# Patient Record
Sex: FEMALE | URBAN - METROPOLITAN AREA
[De-identification: names, ages, dates, MRNs, and addresses within clinical notes are randomized per-mention and may not be internally consistent; named-entity substitution may affect disease eponyms.]

---

## 2020-12-28 ENCOUNTER — OFFICE VISIT (OUTPATIENT)
Dept: URBAN - METROPOLITAN AREA CLINIC 40 | Facility: CLINIC | Age: 40
End: 2020-12-28

## 2020-12-28 RX ORDER — NYSTATIN CREAM 100000 [USP'U]/G
CREAM TOPICAL
Qty: 30 G | Status: ACTIVE | COMMUNITY

## 2023-06-12 ENCOUNTER — OFFICE VISIT (OUTPATIENT)
Dept: URBAN - METROPOLITAN AREA CLINIC 40 | Facility: CLINIC | Age: 43
End: 2023-06-12
Payer: COMMERCIAL

## 2023-06-12 ENCOUNTER — LAB OUTSIDE AN ENCOUNTER (OUTPATIENT)
Dept: URBAN - METROPOLITAN AREA CLINIC 40 | Facility: CLINIC | Age: 43
End: 2023-06-12

## 2023-06-12 ENCOUNTER — DASHBOARD ENCOUNTERS (OUTPATIENT)
Age: 43
End: 2023-06-12

## 2023-06-12 VITALS
HEIGHT: 67 IN | HEART RATE: 64 BPM | TEMPERATURE: 94.3 F | DIASTOLIC BLOOD PRESSURE: 70 MMHG | SYSTOLIC BLOOD PRESSURE: 110 MMHG | BODY MASS INDEX: 32.68 KG/M2 | WEIGHT: 208.2 LBS

## 2023-06-12 DIAGNOSIS — K64.8 INTERNAL BLEEDING HEMORRHOIDS: ICD-10-CM

## 2023-06-12 DIAGNOSIS — Z86.010 PERSONAL HISTORY OF COLONIC POLYPS: ICD-10-CM

## 2023-06-12 DIAGNOSIS — Z80.0 FAMILY HX OF COLON CANCER: ICD-10-CM

## 2023-06-12 PROBLEM — 312824007: Status: ACTIVE | Noted: 2023-06-12

## 2023-06-12 PROBLEM — 75884004: Status: ACTIVE | Noted: 2023-06-12

## 2023-06-12 PROBLEM — 428283002: Status: ACTIVE | Noted: 2023-06-12

## 2023-06-12 PROCEDURE — 99204 OFFICE O/P NEW MOD 45 MIN: CPT | Performed by: INTERNAL MEDICINE

## 2023-06-12 RX ORDER — FLECAINIDE ACETATE 100 MG/1
0.5 TABLET TABLET ORAL
Status: ACTIVE | COMMUNITY

## 2023-06-12 RX ORDER — POLYETHYLENE GLYCOL 3350, SODIUM SULFATE, SODIUM CHLORIDE, POTASSIUM CHLORIDE, ASCORBIC ACID, SODIUM ASCORBATE 140-9-5.2G
AS DIRECTED KIT ORAL ONCE
Qty: 1 | Refills: 0 | OUTPATIENT
Start: 2023-06-12 | End: 2023-06-13

## 2023-06-12 RX ORDER — NYSTATIN CREAM 100000 [USP'U]/G
CREAM TOPICAL
Qty: 30 G | Status: ON HOLD | COMMUNITY

## 2023-06-12 NOTE — HPI-TODAY'S VISIT:
43-year-old  female here to establish care and schedule screening colonoscopy.  She states that her former GI provider has retired.  She has family history of colon cancer and polyps.  She has personal history of colon polyps.  Her last colonoscopy was approximately 4 years ago with finding of polyps. She denies abdominal  pain, NSAID use or blood in the stool.  She states that she has normal formed BM 5-6 times a week, with occasional bright red blood on toilet paper. She denies constipation, knowledge of hemorrhoids or rectal pain. No other concerns or complaints.

## 2023-07-20 ENCOUNTER — OFFICE VISIT (OUTPATIENT)
Dept: URBAN - METROPOLITAN AREA SURGERY CENTER 30 | Facility: SURGERY CENTER | Age: 43
End: 2023-07-20
Payer: COMMERCIAL

## 2023-07-20 DIAGNOSIS — Z86.010 ADENOMAS PERSONAL HISTORY OF COLONIC POLYPS: ICD-10-CM

## 2023-07-20 PROCEDURE — 45378 DIAGNOSTIC COLONOSCOPY: CPT | Performed by: INTERNAL MEDICINE

## 2023-07-20 PROCEDURE — G8907 PT DOC NO EVENTS ON DISCHARG: HCPCS | Performed by: INTERNAL MEDICINE

## 2023-08-21 ENCOUNTER — OFFICE VISIT (OUTPATIENT)
Dept: URBAN - METROPOLITAN AREA CLINIC 40 | Facility: CLINIC | Age: 43
End: 2023-08-21

## 2025-01-29 ENCOUNTER — ERX REFILL RESPONSE (OUTPATIENT)
Dept: URBAN - METROPOLITAN AREA CLINIC 40 | Facility: CLINIC | Age: 45
End: 2025-01-29

## 2025-01-29 ENCOUNTER — OFFICE VISIT (OUTPATIENT)
Dept: URBAN - METROPOLITAN AREA CLINIC 40 | Facility: CLINIC | Age: 45
End: 2025-01-29
Payer: COMMERCIAL

## 2025-01-29 ENCOUNTER — LAB OUTSIDE AN ENCOUNTER (OUTPATIENT)
Dept: URBAN - METROPOLITAN AREA CLINIC 40 | Facility: CLINIC | Age: 45
End: 2025-01-29

## 2025-01-29 VITALS
TEMPERATURE: 98 F | HEIGHT: 67 IN | HEART RATE: 78 BPM | BODY MASS INDEX: 31.86 KG/M2 | WEIGHT: 203 LBS | SYSTOLIC BLOOD PRESSURE: 104 MMHG | DIASTOLIC BLOOD PRESSURE: 80 MMHG

## 2025-01-29 DIAGNOSIS — R10.32 LLQ ABDOMINAL PAIN: ICD-10-CM

## 2025-01-29 PROCEDURE — 99214 OFFICE O/P EST MOD 30 MIN: CPT | Performed by: PHYSICIAN ASSISTANT

## 2025-01-29 RX ORDER — DICYCLOMINE HYDROCHLORIDE 10 MG/1
1 TABLET CAPSULE ORAL THREE TIMES A DAY
Qty: 90 TABLET | Refills: 1 | OUTPATIENT
Start: 2025-01-29 | End: 2025-03-30

## 2025-01-29 RX ORDER — FLECAINIDE ACETATE 100 MG/1
0.5 TABLET TABLET ORAL
Status: ACTIVE | COMMUNITY

## 2025-01-29 RX ORDER — DICYCLOMINE HYDROCHLORIDE 10 MG/1
1 TABLET CAPSULE ORAL THREE TIMES A DAY
Qty: 270 TABLET | Refills: 0 | OUTPATIENT

## 2025-01-29 RX ORDER — DICYCLOMINE HYDROCHLORIDE 10 MG/1
1 TABLET CAPSULE ORAL THREE TIMES A DAY
Qty: 90 TABLET | Refills: 1 | OUTPATIENT

## 2025-01-29 NOTE — PHYSICAL EXAM GASTROINTESTINAL
Abdomen , soft, TTP L >R, nondistended , no guarding or rigidity , no masses palpable , normal bowel sounds , Liver and Spleen,  no hepatosplenomegaly , liver nontender

## 2025-01-29 NOTE — HPI-TODAY'S VISIT:
Ms. Flores is a 44 year-old  female seen 6/12/23 to schedule surveillance colonosopy with history of colon polyps.  Patient presents in the office today with abdominal pain.  At the time of her colonoscopy in 2023, no evidence of colon polyps.  Internal hemorrhoids seen in his records.  Skin tags and a 5-year surveillance recommended.  Patient reports that the pain has begun only in the last week, initially improved after spontaneous bowel movement which was nonbloody.  No melena.  Denies nausea, vomiting.  No fever but reports chills on yesterday without recurrence.  No reported chest pain, shortness of breath.   Her pain is localized to the right and left lower quadrants, more in the left lower quadrant.  She has been seen by her OB/GYN provider and is scheduled for pelvic ultrasound.  She does still have irregular periods after history of partial hysterectomy. No regular use of NSAIDs, alcohol or tobacco products.  However, she is an  admits increased stressors recently.  No evidence of depressed mood.

## 2025-01-30 ENCOUNTER — TELEPHONE ENCOUNTER (OUTPATIENT)
Dept: URBAN - METROPOLITAN AREA CLINIC 40 | Facility: CLINIC | Age: 45
End: 2025-01-30

## 2025-01-30 RX ORDER — FLUCONAZOLE 100 MG/1
1 TABLET TABLET ORAL ONCE
Qty: 1 TABLET | Refills: 1 | OUTPATIENT
Start: 2025-01-30 | End: 2025-02-01

## 2025-01-30 RX ORDER — DICYCLOMINE HYDROCHLORIDE 10 MG/1
1 TABLET CAPSULE ORAL THREE TIMES A DAY
Qty: 90 TABLET | Refills: 0

## 2025-01-30 RX ORDER — CIPROFLOXACIN 500 MG/1
1 TABLET TABLET, FILM COATED ORAL
Qty: 14 TABLET | Refills: 0 | OUTPATIENT
Start: 2025-01-30 | End: 2025-02-06

## 2025-01-30 RX ORDER — METRONIDAZOLE 500 MG/1
1 TABLET TABLET ORAL THREE TIMES A DAY
Qty: 21 TABLET | Refills: 0 | OUTPATIENT
Start: 2025-01-30 | End: 2025-02-06

## 2025-02-17 ENCOUNTER — OFFICE VISIT (OUTPATIENT)
Dept: URBAN - METROPOLITAN AREA CLINIC 40 | Facility: CLINIC | Age: 45
End: 2025-02-17

## 2025-02-17 RX ORDER — DICYCLOMINE HYDROCHLORIDE 10 MG/1
1 TABLET CAPSULE ORAL THREE TIMES A DAY
Qty: 90 TABLET | Refills: 0 | Status: ACTIVE | COMMUNITY

## 2025-02-17 RX ORDER — FLECAINIDE ACETATE 100 MG/1
0.5 TABLET TABLET ORAL
Status: ACTIVE | COMMUNITY

## 2025-02-18 ENCOUNTER — OFFICE VISIT (OUTPATIENT)
Dept: URBAN - METROPOLITAN AREA CLINIC 40 | Facility: CLINIC | Age: 45
End: 2025-02-18

## 2025-02-18 RX ORDER — FLECAINIDE ACETATE 100 MG/1
0.5 TABLET TABLET ORAL
Status: ACTIVE | COMMUNITY

## 2025-02-18 RX ORDER — DICYCLOMINE HYDROCHLORIDE 10 MG/1
1 TABLET CAPSULE ORAL THREE TIMES A DAY
Qty: 90 TABLET | Refills: 0 | Status: ACTIVE | COMMUNITY

## 2025-02-18 NOTE — HPI-TODAY'S VISIT:
Ms. Flores is a 44 year-old  female who was seen 1/29/25 with abdominal pain.  At the time of her colonoscopy in 2023, no evidence of colon polyps.  Internal hemorrhoids seen in his records.  Skin tags and a 5-year surveillance recommended.  Her pain was localized to the right and left lower quadrants, more in the left lower quadrant.  She has been seen by her OB/GYN provider and is scheduled for pelvic ultrasound.  She does still have irregular periods after history of partial hysterectomy.  No regular use of NSAIDs, alcohol or tobacco products.  However, she is an  admits increased stressors recently.  No evidence of depressed mood.  Recent CT A/P w/ contrast notable for uncomplicated sigmoid diverticulitis. She was given both Cipro and Flagyl, dicyclomine prn pain.

## 2025-02-19 ENCOUNTER — OFFICE VISIT (OUTPATIENT)
Dept: URBAN - METROPOLITAN AREA CLINIC 40 | Facility: CLINIC | Age: 45
End: 2025-02-19
Payer: COMMERCIAL

## 2025-02-19 VITALS
WEIGHT: 204.6 LBS | HEART RATE: 66 BPM | BODY MASS INDEX: 32.11 KG/M2 | SYSTOLIC BLOOD PRESSURE: 120 MMHG | TEMPERATURE: 96.9 F | DIASTOLIC BLOOD PRESSURE: 78 MMHG | HEIGHT: 67 IN

## 2025-02-19 DIAGNOSIS — K57.92 ACUTE DIVERTICULITIS: ICD-10-CM

## 2025-02-19 DIAGNOSIS — Z87.19 HISTORY OF DIVERTICULITIS OF COLON: ICD-10-CM

## 2025-02-19 PROCEDURE — 99213 OFFICE O/P EST LOW 20 MIN: CPT | Performed by: PHYSICIAN ASSISTANT

## 2025-02-19 RX ORDER — FLECAINIDE ACETATE 100 MG/1
0.5 TABLET TABLET ORAL
Status: ACTIVE | COMMUNITY

## 2025-02-19 RX ORDER — DICYCLOMINE HYDROCHLORIDE 10 MG/1
1 TABLET CAPSULE ORAL THREE TIMES A DAY
Qty: 90 TABLET | Refills: 0 | Status: ACTIVE | COMMUNITY

## 2025-02-19 RX ORDER — DICYCLOMINE HYDROCHLORIDE 10 MG/1
1 TABLET CAPSULE ORAL THREE TIMES A DAY
OUTPATIENT

## 2025-02-19 NOTE — HPI-TODAY'S VISIT:
Ms. Flores is a 44 year-old  female who was seen 1/29/25 with abdominal pain. Previously seen with history of colon polyps. At the time of her colonoscopy in 2023, no evidence of colon polyps.  Internal hemorrhoids seen in his records.  Skin tags and a 5-year surveillance recommended.  Her pain was localized to the right and left lower quadrants, more in the left lower quadrant.  She has been seen by her OB/GYN provider and is scheduled for pelvic ultrasound.  She does still have irregular periods after history of partial hysterectomy.  No regular use of NSAIDs, alcohol or tobacco products.  However, she is an  admits increased stressors recently.  No evidence of depressed mood.  Recent CT A/P w/ contrast 1/30/25 notable for uncomplicated sigmoid diverticulitis. Incidental fat-containing umbilical hernia and borderilne hepatomegaly seen on imaging. She was given both Cipro and Flagyl, dicyclomine prn pain.  Patient has had resolution of abdominal pain and is eating with normal appetite since completing antibiotics for diverticulitis.  She also did require the fluconazole and has no issues following administration of that 1 time dosing.  Admits the dicyclomine did cause drowsiness and so she was taking this in the evening as needed for pain.  No other complaints today.